# Patient Record
Sex: FEMALE | Race: OTHER | HISPANIC OR LATINO
[De-identification: names, ages, dates, MRNs, and addresses within clinical notes are randomized per-mention and may not be internally consistent; named-entity substitution may affect disease eponyms.]

---

## 2017-02-08 ENCOUNTER — APPOINTMENT (OUTPATIENT)
Dept: BARIATRICS | Facility: CLINIC | Age: 37
End: 2017-02-08

## 2017-02-08 VITALS
HEIGHT: 64 IN | TEMPERATURE: 98 F | DIASTOLIC BLOOD PRESSURE: 70 MMHG | HEART RATE: 80 BPM | SYSTOLIC BLOOD PRESSURE: 102 MMHG | WEIGHT: 162 LBS | BODY MASS INDEX: 27.66 KG/M2

## 2017-02-08 DIAGNOSIS — E66.01 MORBID (SEVERE) OBESITY DUE TO EXCESS CALORIES: ICD-10-CM

## 2017-02-08 DIAGNOSIS — G47.33 OBSTRUCTIVE SLEEP APNEA (ADULT) (PEDIATRIC): ICD-10-CM

## 2017-02-08 DIAGNOSIS — Z87.19 PERSONAL HISTORY OF OTHER DISEASES OF THE DIGESTIVE SYSTEM: ICD-10-CM

## 2017-02-08 RX ORDER — DOCUSATE SODIUM 100 MG/1
100 CAPSULE, LIQUID FILLED ORAL
Refills: 0 | Status: ACTIVE | COMMUNITY
Start: 2017-02-08

## 2017-06-07 ENCOUNTER — APPOINTMENT (OUTPATIENT)
Dept: BARIATRICS | Facility: CLINIC | Age: 37
End: 2017-06-07

## 2017-08-04 ENCOUNTER — APPOINTMENT (OUTPATIENT)
Dept: BARIATRICS | Facility: CLINIC | Age: 37
End: 2017-08-04
Payer: MEDICAID

## 2017-08-04 VITALS
BODY MASS INDEX: 27.42 KG/M2 | SYSTOLIC BLOOD PRESSURE: 114 MMHG | HEART RATE: 86 BPM | TEMPERATURE: 97.5 F | DIASTOLIC BLOOD PRESSURE: 78 MMHG | HEIGHT: 64 IN | WEIGHT: 160.6 LBS | OXYGEN SATURATION: 100 %

## 2017-08-04 DIAGNOSIS — G89.29 OTHER CHRONIC PAIN: ICD-10-CM

## 2017-08-04 DIAGNOSIS — E66.3 OVERWEIGHT: ICD-10-CM

## 2017-08-04 PROCEDURE — 99214 OFFICE O/P EST MOD 30 MIN: CPT

## 2017-08-04 RX ORDER — HYDROCODONE BITARTRATE AND ACETAMINOPHEN 5; 300 MG/1; MG/1
5-300 TABLET ORAL
Refills: 0 | Status: ACTIVE | COMMUNITY
Start: 2017-08-04

## 2017-12-04 ENCOUNTER — OUTPATIENT (OUTPATIENT)
Dept: OUTPATIENT SERVICES | Facility: HOSPITAL | Age: 37
LOS: 1 days | Discharge: HOME | End: 2017-12-04

## 2017-12-04 DIAGNOSIS — Z98.89 OTHER SPECIFIED POSTPROCEDURAL STATES: Chronic | ICD-10-CM

## 2017-12-04 DIAGNOSIS — Z90.49 ACQUIRED ABSENCE OF OTHER SPECIFIED PARTS OF DIGESTIVE TRACT: Chronic | ICD-10-CM

## 2017-12-07 DIAGNOSIS — K75.2 NONSPECIFIC REACTIVE HEPATITIS: ICD-10-CM

## 2018-02-02 ENCOUNTER — APPOINTMENT (OUTPATIENT)
Dept: BARIATRICS | Facility: CLINIC | Age: 38
End: 2018-02-02

## 2018-11-07 ENCOUNTER — OTHER (OUTPATIENT)
Age: 38
End: 2018-11-07

## 2019-05-09 ENCOUNTER — APPOINTMENT (OUTPATIENT)
Dept: SURGERY | Facility: CLINIC | Age: 39
End: 2019-05-09
Payer: COMMERCIAL

## 2019-05-09 ENCOUNTER — LABORATORY RESULT (OUTPATIENT)
Age: 39
End: 2019-05-09

## 2019-05-09 VITALS
BODY MASS INDEX: 27.49 KG/M2 | TEMPERATURE: 98.3 F | HEIGHT: 64 IN | SYSTOLIC BLOOD PRESSURE: 114 MMHG | DIASTOLIC BLOOD PRESSURE: 79 MMHG | HEART RATE: 81 BPM | WEIGHT: 161 LBS

## 2019-05-09 DIAGNOSIS — Z80.49 FAMILY HISTORY OF MALIGNANT NEOPLASM OF OTHER GENITAL ORGANS: ICD-10-CM

## 2019-05-09 DIAGNOSIS — Z78.9 OTHER SPECIFIED HEALTH STATUS: ICD-10-CM

## 2019-05-09 PROCEDURE — 99212 OFFICE O/P EST SF 10 MIN: CPT

## 2019-05-30 NOTE — ASSESSMENT
[FreeTextEntry1] : 39F s/p LSG 5/2016. Stable.\par \par bloodwork\par f.u 6 months\par continue exercise

## 2019-05-30 NOTE — PHYSICAL EXAM
[Thin] : thin [Ulcers] : no ulcers [Normal] : affect appropriate [de-identified] : soft, NT, ND, well healed scars, no hernias

## 2019-06-15 ENCOUNTER — APPOINTMENT (OUTPATIENT)
Dept: PLASTIC SURGERY | Facility: CLINIC | Age: 39
End: 2019-06-15
Payer: COMMERCIAL

## 2019-06-15 VITALS — BODY MASS INDEX: 27.83 KG/M2 | HEIGHT: 64 IN | WEIGHT: 163 LBS

## 2019-06-15 DIAGNOSIS — M79.3 PANNICULITIS, UNSPECIFIED: ICD-10-CM

## 2019-06-15 DIAGNOSIS — Z98.84 BARIATRIC SURGERY STATUS: ICD-10-CM

## 2019-06-15 DIAGNOSIS — M54.9 DORSALGIA, UNSPECIFIED: ICD-10-CM

## 2019-06-15 PROCEDURE — 99203 OFFICE O/P NEW LOW 30 MIN: CPT

## 2019-06-17 PROBLEM — M79.3 PANNICULITIS: Status: ACTIVE | Noted: 2017-08-04

## 2019-06-17 NOTE — HISTORY OF PRESENT ILLNESS
[FreeTextEntry1] : Patient status post gastric sleeve operation and 102 poundd weight loss. Patient complains of back pain and abdominal pannus with panniculitis Patient would like abdominoplasty and is here for consultation.

## 2019-06-17 NOTE — REVIEW OF SYSTEMS
[Negative] : Endocrine [FreeTextEntry6] : sleep apnea [FreeTextEntry7] : status post sleeve [FreeTextEntry9] : back pain  [de-identified] : herniated disc [de-identified] : rash under pannus

## 2019-06-17 NOTE — REASON FOR VISIT
[Consultation] : a consultation visit [FreeTextEntry1] : Patient presents to the office at the request of Dr Rob for an abdominoplasty / panniculectomy consult. Pt states he had a gastric sleeve done on 05/09/2016 by Dr Tyler Aj and lost over 100 pounds (prior weight was 265). Pt now weights 163 pounds since the past 8 months or more and is 5'4 tall (BMI 27.9 - BSA 1.79) and reports she wants to lose 15 more pounds. Pt is c/o excess skin on abdomen, rash, irritation, odor and skin breakdown. Pt has 1 child and denies any other abdominal surgery. Pt is here to discuss w/MD.

## 2023-10-27 ENCOUNTER — APPOINTMENT (OUTPATIENT)
Dept: OBGYN | Facility: CLINIC | Age: 43
End: 2023-10-27